# Patient Record
Sex: MALE | Race: WHITE | NOT HISPANIC OR LATINO | URBAN - METROPOLITAN AREA
[De-identification: names, ages, dates, MRNs, and addresses within clinical notes are randomized per-mention and may not be internally consistent; named-entity substitution may affect disease eponyms.]

---

## 2019-04-22 ENCOUNTER — EMERGENCY (EMERGENCY)
Facility: HOSPITAL | Age: 44
LOS: 0 days | Discharge: HOME | End: 2019-04-22
Attending: EMERGENCY MEDICINE | Admitting: EMERGENCY MEDICINE
Payer: OTHER MISCELLANEOUS

## 2019-04-22 VITALS
SYSTOLIC BLOOD PRESSURE: 109 MMHG | OXYGEN SATURATION: 100 % | RESPIRATION RATE: 18 BRPM | HEART RATE: 61 BPM | DIASTOLIC BLOOD PRESSURE: 70 MMHG | TEMPERATURE: 98 F

## 2019-04-22 VITALS
DIASTOLIC BLOOD PRESSURE: 67 MMHG | HEART RATE: 74 BPM | RESPIRATION RATE: 18 BRPM | SYSTOLIC BLOOD PRESSURE: 136 MMHG | OXYGEN SATURATION: 100 % | TEMPERATURE: 96 F
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
## 2019-04-22 DIAGNOSIS — S89.90XA UNSPECIFIED INJURY OF UNSPECIFIED LOWER LEG, INITIAL ENCOUNTER: ICD-10-CM
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
## 2019-04-22 DIAGNOSIS — S76.212A STRAIN OF ADDUCTOR MUSCLE, FASCIA AND TENDON OF LEFT THIGH, INITIAL ENCOUNTER: ICD-10-CM
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
## 2019-04-22 LAB
ANION GAP SERPL CALC-SCNC: 9 MMOL/L — SIGNIFICANT CHANGE UP (ref 7–14)
BASOPHILS # BLD AUTO: 0.02 K/UL — SIGNIFICANT CHANGE UP (ref 0–0.2)
BASOPHILS NFR BLD AUTO: 0.4 % — SIGNIFICANT CHANGE UP (ref 0–1)
BUN SERPL-MCNC: 15 MG/DL — SIGNIFICANT CHANGE UP (ref 10–20)
CALCIUM SERPL-MCNC: 9.3 MG/DL — SIGNIFICANT CHANGE UP (ref 8.5–10.1)
CHLORIDE SERPL-SCNC: 103 MMOL/L — SIGNIFICANT CHANGE UP (ref 98–110)
CO2 SERPL-SCNC: 25 MMOL/L — SIGNIFICANT CHANGE UP (ref 17–32)
CREAT SERPL-MCNC: 0.9 MG/DL — SIGNIFICANT CHANGE UP (ref 0.7–1.5)
EOSINOPHIL # BLD AUTO: 0.03 K/UL — SIGNIFICANT CHANGE UP (ref 0–0.7)
EOSINOPHIL NFR BLD AUTO: 0.6 % — SIGNIFICANT CHANGE UP (ref 0–8)
GLUCOSE SERPL-MCNC: 91 MG/DL — SIGNIFICANT CHANGE UP (ref 70–99)
HCT VFR BLD CALC: 39.5 % — LOW (ref 42–52)
HGB BLD-MCNC: 12.5 G/DL — LOW (ref 14–18)
IMM GRANULOCYTES NFR BLD AUTO: 0.2 % — SIGNIFICANT CHANGE UP (ref 0.1–0.3)
LYMPHOCYTES # BLD AUTO: 1.59 K/UL — SIGNIFICANT CHANGE UP (ref 1.2–3.4)
LYMPHOCYTES # BLD AUTO: 32.6 % — SIGNIFICANT CHANGE UP (ref 20.5–51.1)
MCHC RBC-ENTMCNC: 26.7 PG — LOW (ref 27–31)
MCHC RBC-ENTMCNC: 31.6 G/DL — LOW (ref 32–37)
MCV RBC AUTO: 84.4 FL — SIGNIFICANT CHANGE UP (ref 80–94)
MONOCYTES # BLD AUTO: 0.52 K/UL — SIGNIFICANT CHANGE UP (ref 0.1–0.6)
MONOCYTES NFR BLD AUTO: 10.7 % — HIGH (ref 1.7–9.3)
NEUTROPHILS # BLD AUTO: 2.7 K/UL — SIGNIFICANT CHANGE UP (ref 1.4–6.5)
NEUTROPHILS NFR BLD AUTO: 55.5 % — SIGNIFICANT CHANGE UP (ref 42.2–75.2)
NRBC # BLD: 0 /100 WBCS — SIGNIFICANT CHANGE UP (ref 0–0)
PLATELET # BLD AUTO: 187 K/UL — SIGNIFICANT CHANGE UP (ref 130–400)
POTASSIUM SERPL-MCNC: 4.6 MMOL/L — SIGNIFICANT CHANGE UP (ref 3.5–5)
POTASSIUM SERPL-SCNC: 4.6 MMOL/L — SIGNIFICANT CHANGE UP (ref 3.5–5)
RBC # BLD: 4.68 M/UL — LOW (ref 4.7–6.1)
RBC # FLD: 12.8 % — SIGNIFICANT CHANGE UP (ref 11.5–14.5)
SODIUM SERPL-SCNC: 137 MMOL/L — SIGNIFICANT CHANGE UP (ref 135–146)
WBC # BLD: 4.87 K/UL — SIGNIFICANT CHANGE UP (ref 4.8–10.8)
WBC # FLD AUTO: 4.87 K/UL — SIGNIFICANT CHANGE UP (ref 4.8–10.8)

## 2019-04-22 PROCEDURE — 76705 ECHO EXAM OF ABDOMEN: CPT | Mod: 26

## 2019-04-22 PROCEDURE — 99284 EMERGENCY DEPT VISIT MOD MDM: CPT

## 2019-04-22 PROCEDURE — 72193 CT PELVIS W/DYE: CPT | Mod: 26

## 2019-04-22 NOTE — ED PROVIDER NOTE - CARE PLAN
Assessment and plan of treatment:	Plan: labs, ct, pain control, reassess. Principal Discharge DX:	Groin strain, left, initial encounter  Assessment and plan of treatment:	Plan: labs, ct, pain control, reassess.

## 2019-04-22 NOTE — ED PROVIDER NOTE - ATTENDING CONTRIBUTION TO CARE
I personally evaluated the patient. I reviewed the Resident’s or Physician Assistant’s note (as assigned above), and agree with the findings and plan except as documented in my note.  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 No fever, chills, n/v, cp,  pleuritic cp, sob, palpitations, diaphoresis, cough, ha/lh/dizziness, numbness/tingling, neck pain/ stiffness, abd pain, diarrhea, constipation, melena/brbpr, urinary symptoms, testicular swelling/erythema/pain, weakness, edema, calf pain/swelling/erythema, sick contacts, recent travel or rash.  Vital Signs: I have reviewed the initial vital signs. Constitutional: WDWN in nad. Sitting on stretcher speaking full sentences. Integumentary: No rash. ENT: MMM NECK: Supple, non-tender, no meningeal signs. Cardiovascular: RRR, radial pulses 2/4 b/l. No JVD. Respiratory: BS present b/l, ctabl, no wheezing or crackles, good resp effort and excursion, good air exchange,  no accessory muscle use, no stridor. Gastrointestinal: BS present throughout all 4 quadrants, soft, nd, nt, no rebound tenderness or guarding, no cvat. L inguinal hernia present, reducible, no testicular swelling /erythena, good testicular lie, good cremasteric reflex. Musculoskeletal: FROM, no edema, no calf pain/swelling/erythema. Neurologic: AAOx3, motor 5/5 and sensation intact throughout upper and lowe ext, CN II-XII intact, No facial droop or slurring of speech. No focal deficits. I personally evaluated the patient. I reviewed the Resident’s or Physician Assistant’s note (as assigned above), and agree with the findings and plan except as documented in my note.  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 No fever, chills, n/v, cp,  pleuritic cp, sob, palpitations, diaphoresis, cough, ha/lh/dizziness, numbness/tingling, neck pain/ stiffness, abd pain, diarrhea, constipation, melena/brbpr, urinary symptoms, testicular swelling/erythema/pain, weakness, edema, calf pain/swelling/erythema, sick contacts, recent travel or rash.  Vital Signs: I have reviewed the initial vital signs. Constitutional: WDWN in nad. Sitting on stretcher speaking full sentences. Integumentary: No rash. ENT: MMM NECK: Supple, non-tender, no meningeal signs. Cardiovascular: RRR, radial pulses 2/4 b/l. No JVD. Respiratory: BS present b/l, ctabl, no wheezing or crackles, good resp effort and excursion, good air exchange,  no accessory muscle use, no stridor. Gastrointestinal: BS present throughout all 4 quadrants, soft, nd, nt, no rebound tenderness or guarding, no cvat. L groin with bulging area present, reducible, no testicular swelling /erythena, good testicular lie, good cremasteric reflex. Musculoskeletal: FROM, no edema, no calf pain/swelling/erythema. Neurologic: AAOx3, motor 5/5 and sensation intact throughout upper and lowe ext, CN II-XII intact, No facial droop or slurring of speech. No focal deficits.

## 2019-04-22 NOTE — ED PROVIDER NOTE - CLINICAL SUMMARY MEDICAL DECISION MAKING FREE TEXT BOX
pt aware of all results including ultrasound versus ct, understands signs and symptoms to return for , give outpt follow up with surgery in case and agrees, results given, will follow up.

## 2019-04-22 NOTE — ED PROVIDER NOTE - NS ED ROS FT
CONST: No fever, chills or bodyaches  CARD: No chest pain, palpitations  RESP: No SOB  GI: see HPI. No N/V  MS: No joint pain, back pain or extremity pain/injury  SKIN: No rashes  NEURO: No dizziness, paresthesias

## 2019-04-22 NOTE — ED PROVIDER NOTE - PROGRESS NOTE DETAILS
pt aware of all labs and imaging, on exam bulging noted for L inguinal canal. ct noted pt aware, feeling better, will follow up with pmd and surgery as discussed.

## 2019-04-22 NOTE — ED PROVIDER NOTE - PHYSICAL EXAMINATION
CONST: Well appearing in NAD  EYES: PERRL, EOMI, Sclera and conjunctiva clear.   CARD: Normal S1 S2; Normal rate and rhythm  RESP: Equal BS B/L, No wheezes, rhonchi or rales. No distress  GI: Soft, L inguinal protrusion upon standing, and muscle protuberance upon coughing. No erythema or discoloration. Easily reducible. No scrotal tenderness or testicular swelling.   MS: Normal ROM in all extremities. No midline spinal tenderness.  SKIN: Warm, dry, no acute rashes. Good turgor

## 2019-04-22 NOTE — ED PROVIDER NOTE - NSFOLLOWUPINSTRUCTIONS_ED_ALL_ED_FT
INGUINAL HERNIA - Discharge Care     Inguinal Hernia    WHAT YOU NEED TO KNOW:    An inguinal hernia happens when organs or abdominal tissue push through a weak spot in the abdominal wall. The abdominal wall is made of fat and muscle. It holds the intestines in place. The hernia may contain fluid, tissue from the abdomen, or part of an organ (such as an intestine).Inguinal Hernia         DISCHARGE INSTRUCTIONS:    Seek care immediately if:     You have severe abdominal pain with nausea and vomiting.       Your abdomen is larger than usual.       Your hernia gets bigger or is purple or blue.       You see blood in your bowel movements.      You feel weak, dizzy, or faint.     Contact your healthcare provider if:     You have a fever.      You have questions or concerns about your condition or care.    Medicine: You may need the following:     NSAIDs, such as ibuprofen, help decrease swelling, pain, and fever. NSAIDs can cause stomach bleeding or kidney problems in certain people. If you take blood thinner medicine, always ask your healthcare provider if NSAIDs are safe for you. Always read the medicine label and follow directions.      Take your medicine as directed. Contact your healthcare provider if you think your medicine is not helping or if you have side effects. Tell him or her if you are allergic to any medicine. Keep a list of the medicines, vitamins, and herbs you take. Include the amounts, and when and why you take them. Bring the list or the pill bottles to follow-up visits. Carry your medicine list with you in case of an emergency.    Follow up with your healthcare provider as directed: Write down your questions so you remember to ask them during your visits.    Manage your symptoms and prevent another hernia:     Do not lift anything heavy. Heavy lifting can make your hernia worse or cause another hernia. Ask your healthcare provider how much is safe for you to lift.       Drink liquids as directed. Liquids may prevent constipation and straining during a bowel movement. Ask how much liquid to drink each day and which liquids are best for you.       Eat foods high in fiber. Fiber may prevent constipation and straining during a bowel movement. Foods that contain fiber include fruits, vegetables, beans, lentils, and whole grains.       Maintain a healthy weight. If you are overweight, weight loss may prevent your hernia from getting worse. It may also prevent another hernia. Talk to your healthcare provider about exercise and how to lose weight safely if you are overweight.       Do not smoke. Nicotine and other chemicals in cigarettes and cigars can weaken the abdominal wall. This may increase your risk for another hernia. Ask your healthcare provider for information if you currently smoke and need help to quit. E-cigarettes or smokeless tobacco still contain nicotine. Talk to your healthcare provider before you use these products.

## 2019-04-22 NOTE — ED PROVIDER NOTE - CARE PROVIDER_API CALL
Rao Fink)  Surgery  [address] [facility] [address], [facility] [address]  [place] [address]  Phone: [phone]  Fax: [phone]  Follow Up Time:

## 2019-04-22 NOTE — ED PROVIDER NOTE - OBJECTIVE STATEMENT
44yo male with no significant PMHx presents c/o L groin/thigh pain s/p lifting a stove at work today ([employer]). Patient reports lifted stove and immediately felt pulling pain in L groin with radiation to back and L posterior thigh. Worse with walking, without specific alleviating factors. Patient denies scrotal swelling or pancho testicular pain. No h/o hernias.

## 2019-04-25 PROBLEM — Z78.9 OTHER SPECIFIED HEALTH STATUS: Chronic | Status: ACTIVE | Noted: 2019-04-22

## 2019-04-25 PROBLEM — Z00.00 ENCOUNTER FOR PREVENTIVE HEALTH EXAMINATION: Status: ACTIVE | Noted: 2019-04-25

## 2019-05-07 ENCOUNTER — APPOINTMENT (OUTPATIENT)
Dept: SURGERY | Facility: CLINIC | Age: 44
End: 2019-05-07
Payer: OTHER MISCELLANEOUS

## 2019-05-07 VITALS — WEIGHT: 190 LBS | BODY MASS INDEX: 26.6 KG/M2 | HEIGHT: 71 IN

## 2019-05-07 PROCEDURE — 99243 OFF/OP CNSLTJ NEW/EST LOW 30: CPT

## 2019-05-07 NOTE — ASSESSMENT
[FreeTextEntry1] : Rao is a pleasant 43-year-old  with no significant past medical history who was recently lifting a heavy stove at work when he felt pain and burning in his left groin quickly followed by swelling prompting a visit to the emergency room where he underwent an ultrasound and CT scan of the pelvis confirming a left inguinal hernia warranting surgical evaluation.\par \par Physical examination demonstrates a moderate to large tender easily reducible left inguinal hernia requiring surgical repair. There is no evidence of incarceration or strangulation, and the patient denies any symptoms of obstruction.  Examination of his right groin demonstrates some mild weakness but no obvious hernia. Both testicles are normal. His umbilical examination is unremarkable. His current BMI is 26.5.\par \par I explained the pros and cons of surgery, as well as all risks, benefits, indications and alternatives of the procedure and the patient understood and agreed. The patient would like this done as soon as possible in light of progressively worsening symptoms, and to minimize time off from work. Rao was scheduled for the repair of his left inguinal hernia with mesh on Friday, May 31, 2019 (and sooner if a spot becomes available) under local with IV sed[facility] at [facility] with presurgical testing waived.  The patient was encouraged to avoid heavy lifting and strenuous activity in the interim, of course.

## 2019-05-07 NOTE — CONSULT LETTER
[Courtesy Letter:] : I had the pleasure of seeing your patient, [unfilled], in my office today. [Dear  ___] : Dear  [unfilled], [Please see my note below.] : Please see my note below. [Consult Closing:] : Thank you very much for allowing me to participate in the care of this patient.  If you have any questions, please do not hesitate to contact me. [DrNatalie  ___] : Dr. CHRISTIE [FreeTextEntry3] : Respectfully,\par \par Rao Fink M.D., FACS\par

## 2019-05-07 NOTE — PHYSICAL EXAM
[No Rash or Lesion] : No rash or lesion [Normal Breath Sounds] : Normal breath sounds [Calm] : calm [Alert] : alert [de-identified] : healthy [JVD] : no jugular venous distention  [de-identified] : soft and flat abdomen\par  [de-identified] : normal [de-identified] : left inguinal hernia [de-identified] : normal testicles

## 2019-05-07 NOTE — DATA REVIEWED
[FreeTextEntry1] : Reviewed recent CT scan of abdomen and pelvis and ultrasound done in the emergency room

## 2019-05-30 NOTE — ASU PATIENT PROFILE, ADULT - CENTRAL VENOUS CATHETER
Returned call. Informed patient of preop lab scheduled for 9/24, 2pm at [facility]. Understanding expressed.
no

## 2019-05-31 ENCOUNTER — APPOINTMENT (OUTPATIENT)
Dept: SURGERY | Facility: AMBULATORY SURGERY CENTER | Age: 44
End: 2019-05-31
Payer: OTHER MISCELLANEOUS

## 2019-05-31 ENCOUNTER — OUTPATIENT (OUTPATIENT)
Dept: OUTPATIENT SERVICES | Facility: HOSPITAL | Age: 44
LOS: 1 days | Discharge: HOME | End: 2019-05-31

## 2019-05-31 VITALS
RESPIRATION RATE: 18 BRPM | DIASTOLIC BLOOD PRESSURE: 58 MMHG | OXYGEN SATURATION: 100 % | SYSTOLIC BLOOD PRESSURE: 109 MMHG | HEART RATE: 60 BPM

## 2019-05-31 VITALS
WEIGHT: 190.04 LBS | OXYGEN SATURATION: 99 % | RESPIRATION RATE: 20 BRPM | HEIGHT: 71 IN | TEMPERATURE: 98 F | SYSTOLIC BLOOD PRESSURE: 111 MMHG | DIASTOLIC BLOOD PRESSURE: 64 MMHG | HEART RATE: 74 BPM

## 2019-05-31 PROCEDURE — 49505 PRP I/HERN INIT REDUC >5 YR: CPT | Mod: LT

## 2019-05-31 RX ORDER — CELECOXIB 200 MG/1
200 CAPSULE ORAL ONCE
Refills: 0 | Status: DISCONTINUED | OUTPATIENT
Start: 2019-05-31 | End: 2019-05-31

## 2019-05-31 RX ORDER — TRAMADOL HYDROCHLORIDE 50 MG/1
1 TABLET ORAL
Qty: 30 | Refills: 0
Start: 2019-05-31 | End: 2019-06-04

## 2019-05-31 RX ORDER — HYDROMORPHONE HYDROCHLORIDE 2 MG/ML
0.5 INJECTION INTRAMUSCULAR; INTRAVENOUS; SUBCUTANEOUS
Refills: 0 | Status: DISCONTINUED | OUTPATIENT
Start: 2019-05-31 | End: 2019-05-31

## 2019-05-31 RX ORDER — SODIUM CHLORIDE 9 MG/ML
1000 INJECTION, SOLUTION INTRAVENOUS
Refills: 0 | Status: DISCONTINUED | OUTPATIENT
Start: 2019-05-31 | End: 2019-06-15

## 2019-05-31 RX ORDER — ONDANSETRON 8 MG/1
4 TABLET, FILM COATED ORAL ONCE
Refills: 0 | Status: DISCONTINUED | OUTPATIENT
Start: 2019-05-31 | End: 2019-06-15

## 2019-05-31 RX ADMIN — SODIUM CHLORIDE 100 MILLILITER(S): 9 INJECTION, SOLUTION INTRAVENOUS at 15:51

## 2019-05-31 NOTE — ASU DISCHARGE PLAN (ADULT/PEDIATRIC) - CALL YOUR DOCTOR IF YOU HAVE ANY OF THE FOLLOWING:
Bleeding that does not stop Bleeding that does not stop/Wound/Surgical Site with redness, or foul smelling discharge or pus/Swelling that gets worse/Fever greater than (need to indicate Fahrenheit or Celsius)/Nausea and vomiting that does not stop/Unable to urinate/Inability to tolerate liquids or foods/Pain not relieved by Medications

## 2019-05-31 NOTE — ASU DISCHARGE PLAN (ADULT/PEDIATRIC) - CARE PROVIDER_API CALL
Rao Fink)  Surgery  [address] [facility], [facility] [address]  [place] [address]  Phone: [phone]  Fax: [phone]  Follow Up Time: 1 week

## 2019-06-07 DIAGNOSIS — K40.90 UNILATERAL INGUINAL HERNIA, WITHOUT OBSTRUCTION OR GANGRENE, NOT SPECIFIED AS RECURRENT: ICD-10-CM

## 2019-06-11 ENCOUNTER — APPOINTMENT (OUTPATIENT)
Dept: SURGERY | Facility: CLINIC | Age: 44
End: 2019-06-11
Payer: OTHER MISCELLANEOUS

## 2019-06-11 DIAGNOSIS — K40.90 UNILATERAL INGUINAL HERNIA, W/OUT OBSTRUCTION OR GANGRENE, NOT SPECIFIED AS RECURRENT: ICD-10-CM

## 2019-06-11 PROCEDURE — 99024 POSTOP FOLLOW-UP VISIT: CPT

## 2019-06-11 NOTE — CONSULT LETTER
[FreeTextEntry1] : Dear Dr. Javier George ([employer]), \par \par I had the pleasure of seeing your patient, VY LOPEZ, in my office today. Please see my note below. \par \par Thank you very much for allowing me to participate in the care of this patient. If you have any questions, please do not hesitate to contact me. \par \par \par Respectfully,\par \par Vy Fink M.D., FACS\par  \par \par \par \par cc: Dr. Dedrick Mccormack

## 2019-06-11 NOTE — ASSESSMENT
[FreeTextEntry1] : Rao underwent the repair of his very large direct left inguinal hernia with mesh on May 31, 2019 under local with IV sedation without any problems or complications. His wound is clean, dry and intact. There is no evidence of erythema, seroma formation or infection. He is tolerating a diet and having normal bowel movements. He denies any significant postoperative pain or discomfort at this time.\par \par He was counseled and reassured. He was discharged from the office with no specific followup necessary, but he knows to avoid any heavy lifting or strenuous activity for a total of six weeks postoperatively. He has a followup appointment with the [facility] medical office later this week.

## 2022-12-30 ENCOUNTER — EMERGENCY (EMERGENCY)
Facility: HOSPITAL | Age: 47
LOS: 0 days | Discharge: HOME | End: 2022-12-30
Attending: EMERGENCY MEDICINE | Admitting: EMERGENCY MEDICINE
Payer: OTHER MISCELLANEOUS

## 2022-12-30 VITALS
SYSTOLIC BLOOD PRESSURE: 163 MMHG | RESPIRATION RATE: 18 BRPM | OXYGEN SATURATION: 99 % | DIASTOLIC BLOOD PRESSURE: 80 MMHG | TEMPERATURE: 98 F | WEIGHT: 199.96 LBS | HEART RATE: 89 BPM
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
## 2022-12-30 DIAGNOSIS — M25.562 PAIN IN LEFT KNEE: ICD-10-CM

## 2022-12-30 PROCEDURE — 99283 EMERGENCY DEPT VISIT LOW MDM: CPT | Mod: 25

## 2022-12-30 PROCEDURE — 29505 APPLICATION LONG LEG SPLINT: CPT

## 2022-12-30 PROCEDURE — 73564 X-RAY EXAM KNEE 4 OR MORE: CPT | Mod: 26,LT

## 2022-12-30 RX ORDER — IBUPROFEN 200 MG
600 TABLET ORAL ONCE
Refills: 0 | Status: COMPLETED | OUTPATIENT
Start: 2022-12-30 | End: 2022-12-30

## 2022-12-30 RX ADMIN — Medication 600 MILLIGRAM(S): at 19:37

## 2022-12-30 NOTE — ED PROVIDER NOTE - OBJECTIVE STATEMENT
Pt is a 48 y/o male with no PMH presenting for left knee pain. 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 No numbness or tingling. No other injuries.

## 2022-12-30 NOTE — ED PROVIDER NOTE - PHYSICAL EXAMINATION
CONST: well appearing for age  HEAD:  normocephalic, atraumatic  EYES:  conjunctivae without injection, drainage or discharge  ENMT:  nasal mucosa moist; mouth moist without ulcerations or lesions; throat moist without erythema, exudate, ulcerations or lesions  NECK:  supple  CARDIAC:  regular rate and rhythm, normal S1 and S2, no murmurs, rubs or gallops  RESP:  respiratory rate and effort appear normal for age; lungs are clear to auscultation bilaterally; no rales or wheezes  ABDOMEN:  soft, nontender, nondistended  EXTREMITIES: + medial left knee tenderness, no joint erythema, edema or warmth, + PTs bilaterally  NEURO:  awake and alert, sensation intact throughout, normal movement, normal tone  SKIN:  normal skin color for age and race, well-perfused; warm and dry

## 2022-12-30 NOTE — ED PROVIDER NOTE - PATIENT PORTAL LINK FT
You can access the FollowMyHealth Patient Portal offered by [facility] by registering at the following website: [email or web address]. By joining [facility]’s FollowMyHealth portal, you will also be able to view your health information using other applications (apps) compatible with our system.

## 2022-12-30 NOTE — ED PROVIDER NOTE - ATTENDING CONTRIBUTION TO CARE
47-year-old male no past medical history presents with left knee pain.  Patient works as a  states that he was lifting a few bags and felt a pop sensation in the left medial aspect of his knee.  Felt immediate pain in his ability to ambulate.  States that he feels an instability sensation while walking.  No fall or trauma.  No numbness tingling or weakness.  No similar episodes in the past.    CONSTITUTIONAL: Well-developed; well-nourished; in no acute distress.   SKIN: warm, dry  HEAD: Normocephalic; atraumatic.  EYES: PERRL, EOMI, no conjunctival erythema  ENT: No nasal discharge; airway clear.  EXT: Positive tenderness to medial aspect of the left knee.  Mild edema.  No erythema.  5 out of 5 strength.  Neurovascular intact.  LYMPH: No acute cervical adenopathy.  NEURO: Alert, oriented, grossly unremarkable. neurovascularly intact.  2+ pulses

## 2022-12-30 NOTE — ED PROVIDER NOTE - NSFOLLOWUPINSTRUCTIONS_ED_ALL_ED_FT
Acute Knee Pain, Adult      Many things can cause knee pain. Sometimes, knee pain is sudden (acute) and may be caused by damage, swelling, or irritation of the muscles and tissues that support your knee.    The pain often goes away on its own with time and rest. If the pain does not go away, tests may be done to find out what is causing the pain.      Follow these instructions at home:      If you have a knee sleeve or brace:      •Wear the knee sleeve or brace as told by your doctor. Take it off only as told by your doctor.    •Loosen it if your toes:  •Tingle.      •Become numb.      •Turn cold and blue.        •Keep it clean.    •If the knee sleeve or brace is not waterproof:  •Do not let it get wet.      •Cover it with a watertight covering when you take a bath or shower.        Activity     •Rest your knee.      • Do not do things that cause pain or make pain worse.      •Avoid activities where both feet leave the ground at the same time (high-impact activities). Examples are running, jumping rope, and doing jumping jacks.      •Work with a physical therapist to make a safe exercise program, as told by your doctor.        Managing pain, stiffness, and swelling    •If told, put ice on the knee. To do this:  •If you have a removable knee sleeve or brace, take it off as told by your doctor.      •Put ice in a plastic bag.      •Place a towel between your skin and the bag.      •Leave the ice on for 20 minutes, 2–3 times a day.      •Take off the ice if your skin turns bright red. This is very important. If you cannot feel pain, heat, or cold, you have a greater risk of damage to the area.        •If told, use an elastic bandage to put pressure (compression) on your injured knee.      •Raise your knee above the level of your heart while you are sitting or lying down.      •Sleep with a pillow under your knee.      General instructions     •Take over-the-counter and prescription medicines only as told by your doctor.      • Do not smoke or use any products that contain nicotine or tobacco. If you need help quitting, ask your doctor.      •If you are overweight, work with your doctor and a food expert (dietitian) to set goals to lose weight. Being overweight can make your knee hurt more.      •Watch for any changes in your symptoms.      •Keep all follow-up visits.        Contact a doctor if:    •The knee pain does not stop.      •The knee pain changes or gets worse.      •You have a fever along with knee pain.      •Your knee is red or feels warm when you touch it.      •Your knee gives out or locks up.        Get help right away if:    •Your knee swells, and the swelling gets worse.      •You cannot move your knee.      •You have very bad knee pain that does not get better with pain medicine.        Summary    •Many things can cause knee pain. The pain often goes away on its own with time and rest.      •Your doctor may do tests to find out the cause of the pain.      •Watch for any changes in your symptoms. Relieve your pain with rest, medicines, light activity, and use of ice.      •Get help right away if you cannot move your knee or your knee pain is very bad.      This information is not intended to replace advice given to you by your health care provider. Make sure you discuss any questions you have with your health care provider. Our Emergency Department Referral Coordinators will be reaching out to you in the next 24-48 hours from 9:00am to 5:00pm with a follow up appointment. Please expect a phone call from the hospital in that time frame. If you do not receive a call or if you have any questions or concerns, you can reach them at [phone] or [phone].      Acute Knee Pain, Adult      Many things can cause knee pain. Sometimes, knee pain is sudden (acute) and may be caused by damage, swelling, or irritation of the muscles and tissues that support your knee.    The pain often goes away on its own with time and rest. If the pain does not go away, tests may be done to find out what is causing the pain.      Follow these instructions at home:      If you have a knee sleeve or brace:      •Wear the knee sleeve or brace as told by your doctor. Take it off only as told by your doctor.    •Loosen it if your toes:  •Tingle.      •Become numb.      •Turn cold and blue.        •Keep it clean.    •If the knee sleeve or brace is not waterproof:  •Do not let it get wet.      •Cover it with a watertight covering when you take a bath or shower.        Activity     •Rest your knee.      • Do not do things that cause pain or make pain worse.      •Avoid activities where both feet leave the ground at the same time (high-impact activities). Examples are running, jumping rope, and doing jumping jacks.      •Work with a physical therapist to make a safe exercise program, as told by your doctor.        Managing pain, stiffness, and swelling    •If told, put ice on the knee. To do this:  •If you have a removable knee sleeve or brace, take it off as told by your doctor.      •Put ice in a plastic bag.      •Place a towel between your skin and the bag.      •Leave the ice on for 20 minutes, 2–3 times a day.      •Take off the ice if your skin turns bright red. This is very important. If you cannot feel pain, heat, or cold, you have a greater risk of damage to the area.        •If told, use an elastic bandage to put pressure (compression) on your injured knee.      •Raise your knee above the level of your heart while you are sitting or lying down.      •Sleep with a pillow under your knee.      General instructions     •Take over-the-counter and prescription medicines only as told by your doctor.      • Do not smoke or use any products that contain nicotine or tobacco. If you need help quitting, ask your doctor.      •If you are overweight, work with your doctor and a food expert (dietitian) to set goals to lose weight. Being overweight can make your knee hurt more.      •Watch for any changes in your symptoms.      •Keep all follow-up visits.        Contact a doctor if:    •The knee pain does not stop.      •The knee pain changes or gets worse.      •You have a fever along with knee pain.      •Your knee is red or feels warm when you touch it.      •Your knee gives out or locks up.        Get help right away if:    •Your knee swells, and the swelling gets worse.      •You cannot move your knee.      •You have very bad knee pain that does not get better with pain medicine.        Summary    •Many things can cause knee pain. The pain often goes away on its own with time and rest.      •Your doctor may do tests to find out the cause of the pain.      •Watch for any changes in your symptoms. Relieve your pain with rest, medicines, light activity, and use of ice.      •Get help right away if you cannot move your knee or your knee pain is very bad.      This information is not intended to replace advice given to you by your health care provider. Make sure you discuss any questions you have with your health care provider.

## 2023-06-13 NOTE — CHART NOTE - NSCHARTNOTEFT_GEN_A_CORE
PACU ANESTHESIA ADMISSION NOTE      Procedure: Repair of left inguinal hernia with mesh    Post op diagnosis:  Inguinal hernia, left      ____  Intubated  TV:______       Rate: ______      FiO2: ______    __x__  Patent Airway    ____  Full return of protective reflexes    __x__  Full recovery from anesthesia / back to baseline status    Vitals:  T(f): 97.6  HR:70  BP: 141/62  RR: 16  SpO2: 95%    Mental Status:  __x__ Awake   __x___ Alert   _____ Drowsy   _____ Sedated    Nausea/Vomiting:  _x__ NO  ______Yes,   See Post - Op Orders          Pain Scale (0-10):  _____    Treatment: ____ None    ___x_ See Post - Op/PCA Orders    Post - Operative Fluids:   ____ Oral   __x__ See Post - Op Orders    Plan: Discharge:   __x__Home       _____Floor     _____Critical Care    _____  Other:_________________    Comments: patient tolerated procedure  No anesthesia complications  Transfer to PACU HA, lightheadedness
